# Patient Record
Sex: FEMALE | Race: BLACK OR AFRICAN AMERICAN
[De-identification: names, ages, dates, MRNs, and addresses within clinical notes are randomized per-mention and may not be internally consistent; named-entity substitution may affect disease eponyms.]

---

## 2018-06-08 ENCOUNTER — HOSPITAL ENCOUNTER (EMERGENCY)
Dept: HOSPITAL 62 - ER | Age: 63
LOS: 1 days | Discharge: HOME | End: 2018-06-09
Payer: COMMERCIAL

## 2018-06-08 DIAGNOSIS — R11.2: ICD-10-CM

## 2018-06-08 DIAGNOSIS — R07.9: ICD-10-CM

## 2018-06-08 DIAGNOSIS — M54.6: Primary | ICD-10-CM

## 2018-06-08 DIAGNOSIS — Z87.891: ICD-10-CM

## 2018-06-08 DIAGNOSIS — I10: ICD-10-CM

## 2018-06-08 PROCEDURE — 36415 COLL VENOUS BLD VENIPUNCTURE: CPT

## 2018-06-08 PROCEDURE — 82550 ASSAY OF CK (CPK): CPT

## 2018-06-08 PROCEDURE — 93005 ELECTROCARDIOGRAM TRACING: CPT

## 2018-06-08 PROCEDURE — 85025 COMPLETE CBC W/AUTO DIFF WBC: CPT

## 2018-06-08 PROCEDURE — 99284 EMERGENCY DEPT VISIT MOD MDM: CPT

## 2018-06-08 PROCEDURE — 93010 ELECTROCARDIOGRAM REPORT: CPT

## 2018-06-08 PROCEDURE — 83690 ASSAY OF LIPASE: CPT

## 2018-06-08 PROCEDURE — 84484 ASSAY OF TROPONIN QUANT: CPT

## 2018-06-08 PROCEDURE — 71045 X-RAY EXAM CHEST 1 VIEW: CPT

## 2018-06-08 PROCEDURE — 96361 HYDRATE IV INFUSION ADD-ON: CPT

## 2018-06-08 PROCEDURE — 82553 CREATINE MB FRACTION: CPT

## 2018-06-08 PROCEDURE — 96375 TX/PRO/DX INJ NEW DRUG ADDON: CPT

## 2018-06-08 PROCEDURE — 80053 COMPREHEN METABOLIC PANEL: CPT

## 2018-06-08 PROCEDURE — 96374 THER/PROPH/DIAG INJ IV PUSH: CPT

## 2018-06-08 PROCEDURE — S0119 ONDANSETRON 4 MG: HCPCS

## 2018-06-09 VITALS — SYSTOLIC BLOOD PRESSURE: 106 MMHG | DIASTOLIC BLOOD PRESSURE: 65 MMHG

## 2018-06-09 LAB
ADD MANUAL DIFF: NO
ALBUMIN SERPL-MCNC: 4.2 G/DL (ref 3.5–5)
ALP SERPL-CCNC: 66 U/L (ref 38–126)
ALT SERPL-CCNC: 11 U/L (ref 9–52)
ANION GAP SERPL CALC-SCNC: 11 MMOL/L (ref 5–19)
AST SERPL-CCNC: 15 U/L (ref 14–36)
BASOPHILS # BLD AUTO: 0 10^3/UL (ref 0–0.2)
BASOPHILS NFR BLD AUTO: 0.4 % (ref 0–2)
BILIRUB DIRECT SERPL-MCNC: 0.3 MG/DL (ref 0–0.4)
BILIRUB SERPL-MCNC: 0.3 MG/DL (ref 0.2–1.3)
BUN SERPL-MCNC: 16 MG/DL (ref 7–20)
CALCIUM: 10.7 MG/DL (ref 8.4–10.2)
CHLORIDE SERPL-SCNC: 103 MMOL/L (ref 98–107)
CK MB SERPL-MCNC: 0.58 NG/ML (ref ?–4.55)
CK SERPL-CCNC: 114 U/L (ref 30–135)
CO2 SERPL-SCNC: 31 MMOL/L (ref 22–30)
EOSINOPHIL # BLD AUTO: 0.1 10^3/UL (ref 0–0.6)
EOSINOPHIL NFR BLD AUTO: 1.7 % (ref 0–6)
ERYTHROCYTE [DISTWIDTH] IN BLOOD BY AUTOMATED COUNT: 14.3 % (ref 11.5–14)
GLUCOSE SERPL-MCNC: 118 MG/DL (ref 75–110)
HCT VFR BLD CALC: 37.2 % (ref 36–47)
HGB BLD-MCNC: 12.8 G/DL (ref 12–15.5)
LIPASE SERPL-CCNC: 51.2 U/L (ref 23–300)
LYMPHOCYTES # BLD AUTO: 3.5 10^3/UL (ref 0.5–4.7)
LYMPHOCYTES NFR BLD AUTO: 40.8 % (ref 13–45)
MCH RBC QN AUTO: 28.9 PG (ref 27–33.4)
MCHC RBC AUTO-ENTMCNC: 34.5 G/DL (ref 32–36)
MCV RBC AUTO: 84 FL (ref 80–97)
MONOCYTES # BLD AUTO: 0.4 10^3/UL (ref 0.1–1.4)
MONOCYTES NFR BLD AUTO: 5 % (ref 3–13)
NEUTROPHILS # BLD AUTO: 4.4 10^3/UL (ref 1.7–8.2)
NEUTS SEG NFR BLD AUTO: 52.1 % (ref 42–78)
PLATELET # BLD: 305 10^3/UL (ref 150–450)
POTASSIUM SERPL-SCNC: 3.6 MMOL/L (ref 3.6–5)
PROT SERPL-MCNC: 6.7 G/DL (ref 6.3–8.2)
RBC # BLD AUTO: 4.43 10^6/UL (ref 3.72–5.28)
SODIUM SERPL-SCNC: 145 MMOL/L (ref 137–145)
TOTAL CELLS COUNTED % (AUTO): 100 %
TROPONIN I SERPL-MCNC: < 0.012 NG/ML
WBC # BLD AUTO: 8.4 10^3/UL (ref 4–10.5)

## 2018-06-09 RX ADMIN — NITROGLYCERIN PRN TAB: 0.4 TABLET SUBLINGUAL at 01:43

## 2018-06-09 RX ADMIN — NITROGLYCERIN PRN TAB: 0.4 TABLET SUBLINGUAL at 00:58

## 2018-06-09 NOTE — ER DOCUMENT REPORT
ED General





- General


Chief Complaint: Chest Wall Pain


Stated Complaint: CHEST PAIN


Time Seen by Provider: 06/08/18 23:53


Mode of Arrival: Ambulatory


Information source: Patient, Relative


Notes: 





62-year-old female with hypertension presents with complaint of chest pain that 

started 1 day prior to arrival.  Patient states that she noticed her chest pain 

yesterday while watching TV.  It is located across her anterior chest, left 

breast, epigastric region, with and left para-scapular regions.  She describes 

it as a constant aching pain that is worse with food, movement..  She denies 

any injury.  She denies any prior similar symptoms.  She denies any recent 

illnesses.  Patient states that she had one episode of nausea and vomiting 

yesterday morning which resolved.  Patient has been eating normally today.  

Patient did take Aleve with mild relief. She reports that she drove from 

[employer] [place] by herself earlier today.


TRAVEL OUTSIDE OF THE [place] IN LAST 30 DAYS: No





- HPI


Onset: Yesterday


Onset/Duration: Gradual, Constant


Quality of pain: Achy, Stabbing


Severity: Mild


Associated symptoms: Chest pain, Nausea, Vomiting.  denies: Fever, Shortness of 

breath


Exacerbated by: Movement, Food


Relieved by: Other - Aleve


Similar symptoms previously: No


Recently seen / treated by doctor: No





- Related Data


Allergies/Adverse Reactions: 


 





No Known Allergies Allergy (Unverified 06/09/18 00:34)


 











Past Medical History





- General


Information source: Patient





- Social History


Smoking Status: Former Smoker


Frequency of alcohol use: None


Drug Abuse: None


Lives with: Family


Family History: Reviewed & Not Pertinent


Patient has suicidal ideation: No


Patient has homicidal ideation: No





- Past Medical History


Cardiac Medical History: Reports: Hx Hypertension





Review of Systems





- Review of Systems


Constitutional: denies: Fever, Weakness


EENT: denies: Nose congestion, Sinus discharge, Throat pain


Cardiovascular: Chest pain.  denies: Palpitations, Dyspnea, Syncope, Dizziness, 

Lightheaded


Respiratory: denies: Cough, Hurts to breathe, Short of breath


Gastrointestinal: Nausea, Vomiting - Resolved.  denies: Abdominal pain


Genitourinary: denies: Dysuria, Flank pain


Female Genitourinary: No symptoms reported


Musculoskeletal: Back pain


Skin: denies: Rash


Hematologic/Lymphatic: No symptoms reported


Neurological/Psychological: denies: Confusion, Lost consciousness, Headaches


-: Yes All other systems reviewed and negative





Physical Exam





- Vital signs


Vitals: 


 











Temp Pulse Resp BP Pulse Ox


 


 98.4 F   71   20   158/77 H  99 


 


 06/08/18 23:51  06/08/18 23:51  06/08/18 23:51  06/08/18 23:51  06/08/18 23:51











Interpretation: Normal, Hypertensive





- Notes


Notes: 





PHYSICAL EXAMINATION:





GENERAL: Well-appearing, well-nourished and in no acute distress.





HEAD: Atraumatic, normocephalic.





EYES: Pupils equal round and reactive to light, extraocular movements intact, 

conjunctiva are normal.





ENT: Nares patent, oropharynx clear without exudates.  Moist mucous membranes.





NECK: Normal range of motion, supple without lymphadenopathy





LUNGS: Breath sounds clear to auscultation bilaterally and equal.  No wheezes 

rales or rhonchi.  Anterior chest tender to palpation.





HEART: Regular rate and rhythm without murmurs. equal radial, dp pt pulses





ABDOMEN: Epigastric abdominal tenderness.  No guarding, no rebound.  No masses 

appreciated.





Female : deferred





Musculoskeletal: Normal range of motion, no pitting or edema.  No cyanosis. TTP 

left paraspinal musculature of thoracic spine and scapular area.





NEUROLOGICAL: Cranial nerves grossly intact.  Normal speech, normal gait.  

Normal sensory, motor exams





PSYCH: Normal mood, normal affect.





SKIN: Warm, Dry, normal turgor, no rashes or lesions noted.





Course





- Re-evaluation


Re-evalutation: 








Laboratory











  06/09/18 06/09/18 06/09/18





  01:15 01:15 01:15


 


WBC  8.4  


 


RBC  4.43  


 


Hgb  12.8  


 


Hct  37.2  


 


MCV  84  


 


MCH  28.9  


 


MCHC  34.5  


 


RDW  14.3 H  


 


Plt Count  305  


 


Seg Neutrophils %  52.1  


 


Lymphocytes %  40.8  


 


Monocytes %  5.0  


 


Eosinophils %  1.7  


 


Basophils %  0.4  


 


Absolute Neutrophils  4.4  


 


Absolute Lymphocytes  3.5  


 


Absolute Monocytes  0.4  


 


Absolute Eosinophils  0.1  


 


Absolute Basophils  0.0  


 


Sodium   145.0 


 


Potassium   3.6 


 


Chloride   103 


 


Carbon Dioxide   31 H 


 


Anion Gap   11 


 


BUN   16 


 


Creatinine   0.86 


 


Est GFR ( Amer)   > 60 


 


Est GFR (Non-Af Amer)   > 60 


 


Glucose   118 H 


 


Calcium   10.7 H 


 


Total Bilirubin   0.3 


 


Direct Bilirubin   0.3 


 


Neonat Total Bilirubin   Not Reportable 


 


Neonat Direct Bilirubin   Not Reportable 


 


Neonat Indirect Bili   Not Reportable 


 


AST   15 


 


ALT   11 


 


Alkaline Phosphatase   66 


 


Creatine Kinase   114 


 


CK-MB (CK-2)    0.58


 


Troponin I    < 0.012


 


Total Protein   6.7 


 


Albumin   4.2 


 


Lipase   51.2 














 





Chest X-Ray  06/09/18 00:04


IMPRESSION:


 


No acute cardiopulmonary findings.


 











06/09/18 04:39


62-year-old female with hypertension presents with complaint of chest pain that 

started 1 day prior to arrival.  Patient states that she noticed her chest pain 

yesterday while watching TV.  It is located across her anterior chest, left 

breast, epigastric region, with radiation to her back.  She describes it as a 

constant aching pain that is worse with food, movement.  She denies any injury.

  She denies any prior similar symptoms.  She denies any left scapular a 

paraspinal musculature.  Patient states that she had one episode of nausea and 

vomiting yesterday morning which resolved.  Patient has been eating normally 

today.  Patient did take Aleve with mild relief. She reports  Patient was seen 

by myself upon arrival.  Vital signs were reviewed. Patient is afebrile, 

normotensive and not hypoxic.  Patient does not appear toxic or dehydrated.  

They are in no acute distress.  Previous medical records and nursing notes 

reviewed.  Upon arrival patient was placed on cardiac monitor and EKG was 

obtained which showed the patient be in normal sinus rhythm at a rate of 69.  

Patient received nitroglycerin and does not report relief of pain.  She 

received a GI cocktail and reported improvement of pain.  Patient received 

morphine, Zofran and Toradol.  On third reevaluation patient states pain has 

greatly improved and she is now only experiencing left-sided mid back pain.  

Chest x-ray is without pneumonia, pneumothorax, widened mediastinum.  Repeat 

EKG obtained at 04 100 is unchanged from previous.  Shows the patient again to 

be in normal sinus rhythm at a rate of 53 without any ST elevation, ST 

depression or QTc prolongation.


06/09/18 04:39





06/09/18 04:42


Patient has a heart score of 3 for a moderately suspicious history, age of 62 

and risk factor of hypertension.  Delta troponin pending


06/09/18 05:33


Repeat troponin within normal limits. 


06/09/18 22:14





06/10/18 23:22


Patient's pain, exam is suspicious muscular strain possibly secondary recent 

travel.


06/10/18 23:25


Low suspicion for PE since patient without SOB, hypoxia or tachycardia, lower 

extremity pain and swelling, low suspicion of ACS/Dissection/Pneumonia. 

Troponin and delta wnl. EKG wnl x2. Patient is comfortable with bedside home. 

family is at the bedside. she will f/u with her pcp when she returns home. 

encouraged to return with any concerns





- Vital Signs


Vital signs: 


 











Temp Pulse Resp BP Pulse Ox


 


 98.4 F   59 L  15   106/65   99 


 


 06/08/18 23:51  06/09/18 06:16  06/09/18 06:16  06/09/18 06:16  06/09/18 05:01














- Laboratory


Result Diagrams: 


 06/09/18 01:15





 06/09/18 01:15


Laboratory results interpreted by me: 


 











  06/09/18 06/09/18





  01:15 01:15


 


RDW  14.3 H 


 


Carbon Dioxide   31 H


 


Glucose   118 H


 


Calcium   10.7 H














- Diagnostic Test


Radiology reviewed: Image reviewed, Reports reviewed





- EKG Interpretation by Me


EKG shows normal: Sinus rhythm


Rate: Normal


Rhythm: NSR


When compared to previous EKG there are: Previous EKG unavailable





Discharge





- Discharge


Clinical Impression: 


Chest pain


Qualifiers:


 Chest pain type: unspecified Qualified Code(s): R07.9 - Chest pain, unspecified





Back pain


Qualifiers:


 Back pain location: thoracic back pain Chronicity: unspecified Back pain 

laterality: left Qualified Code(s): M54.6 - Pain in thoracic spine





Condition: Good


Disposition: HOME, SELF-CARE


Instructions:  Chest Wall Pain ([facility]), Upper Back Strain ([facility])


Additional Instructions: 


Follow up with your physician tomorrow for further care or return to the ED 

IMMEDIATELY if symptoms worsen or new concerns occur. If you cannot afford to 

follow up with your primary care physician a list of low cost clinics have been 

provided at the end of your discharge papers as well.


Prescriptions: 


Cyclobenzaprine HCl [Flexeril 10 mg Tablet] 10 mg PO Q8H PRN #10 tablet


 PRN Reason: 


Ibuprofen [Motrin 600 Mg Tablet] 600 mg PO TID #15 tablet

## 2018-06-09 NOTE — RADIOLOGY REPORT (SQ)
EXAM DESCRIPTION:

XR CHEST 1 VIEW



CLINICAL HISTORY:

62 years Female, chest pain



COMPARISON:

None.



NUMBER OF VIEWS/TECHNIQUE:

1/AP



FINDINGS:



Adequate lung volume, clear parenchyma, normal cardiac

silhouette, and 2.5 cm likely benign developmental ossicle at the

inferior aspect of the left glenohumeral joint.



IMPRESSION:



No acute cardiopulmonary findings.

## 2018-06-09 NOTE — ER DOCUMENT REPORT
ED Medical Screen (RME)





- General


Chief Complaint: Chest Wall Pain


Stated Complaint: CHEST PAIN


Time Seen by Provider: 06/08/18 23:53


Notes: 





Patient is a 62 year old female with [facility] s/f HTn p/w chest pain since yesterday 

in the front of her chest and radiating into her back, states her back is 

tender and chest wall is as well, took aleve at home which took the edge of





Physical Exam





- Vital signs


Vitals: 





 











Temp Pulse Resp BP Pulse Ox


 


 98.4 F   71   20   158/77 H  99 


 


 06/08/18 23:51  06/08/18 23:51  06/08/18 23:51  06/08/18 23:51  06/08/18 23:51














- Notes


Notes: 





PHYSICAL EXAM


GENERAL: Alert, interacts well. 


HEAD: Normocephalic, atraumatic.


EYES: Pupils equal, round, and reactive to light. Extraocular movements intact.


ENT: Oral mucosa moist, tongue midline. 


NECK: Full range of motion. Supple. Trachea midline.


LUNGS: Clear to auscultation bilaterally, no wheezes, rales, or rhonchi. No 

respiratory distress.


HEART: chest wall tender and pain reproducible. Regular rate and rhythm. No 

murmurs, gallops, or rubs.


NEUROLOGICAL: Alert and oriented x4. Normal speech.


PSYCH: Normal affect, normal mood.


SKIN: Warm, dry, normal turgor. No rashes or lesions noted.








Course





- Vital Signs


Vital signs: 





 











Temp Pulse Resp BP Pulse Ox


 


 98.4 F   71   20   158/77 H  99 


 


 06/08/18 23:51  06/08/18 23:51  06/08/18 23:51  06/08/18 23:51  06/08/18 23:51